# Patient Record
Sex: MALE | Race: OTHER | Employment: FULL TIME | URBAN - METROPOLITAN AREA
[De-identification: names, ages, dates, MRNs, and addresses within clinical notes are randomized per-mention and may not be internally consistent; named-entity substitution may affect disease eponyms.]

---

## 2019-04-01 ENCOUNTER — HOSPITAL ENCOUNTER (EMERGENCY)
Age: 29
Discharge: HOME | End: 2019-04-01
Attending: EMERGENCY MEDICINE
Payer: SELF-PAY

## 2019-04-01 ENCOUNTER — APPOINTMENT (OUTPATIENT)
Dept: GENERAL RADIOLOGY | Age: 29
End: 2019-04-01
Attending: EMERGENCY MEDICINE
Payer: SELF-PAY

## 2019-04-01 VITALS
RESPIRATION RATE: 16 BRPM | BODY MASS INDEX: 32.99 KG/M2 | WEIGHT: 198 LBS | HEART RATE: 77 BPM | DIASTOLIC BLOOD PRESSURE: 72 MMHG | TEMPERATURE: 98.1 F | OXYGEN SATURATION: 100 % | SYSTOLIC BLOOD PRESSURE: 125 MMHG | HEIGHT: 65 IN

## 2019-04-01 DIAGNOSIS — S62.611A CLOSED DISPLACED FRACTURE OF PROXIMAL PHALANX OF LEFT INDEX FINGER, INITIAL ENCOUNTER: Primary | ICD-10-CM

## 2019-04-01 PROCEDURE — 77030008303 HC SPLNT FNGR ALUM CONC -A

## 2019-04-01 PROCEDURE — 99283 EMERGENCY DEPT VISIT LOW MDM: CPT | Performed by: NURSE PRACTITIONER

## 2019-04-01 PROCEDURE — 75810000301 HC ER LEVEL 1 CLOSED TREATMNT FRACTURE/DISLOCATION: Performed by: NURSE PRACTITIONER

## 2019-04-01 PROCEDURE — 73140 X-RAY EXAM OF FINGER(S): CPT

## 2019-04-01 PROCEDURE — 74011250636 HC RX REV CODE- 250/636: Performed by: NURSE PRACTITIONER

## 2019-04-01 RX ORDER — HYDROCODONE BITARTRATE AND ACETAMINOPHEN 5; 325 MG/1; MG/1
1 TABLET ORAL
Qty: 18 TAB | Refills: 0 | Status: SHIPPED | OUTPATIENT
Start: 2019-04-01 | End: 2019-04-03

## 2019-04-01 RX ORDER — LIDOCAINE HYDROCHLORIDE 10 MG/ML
10 INJECTION INFILTRATION; PERINEURAL
Status: COMPLETED | OUTPATIENT
Start: 2019-04-01 | End: 2019-04-01

## 2019-04-01 RX ADMIN — LIDOCAINE HYDROCHLORIDE 1 ML: 10 INJECTION, SOLUTION INFILTRATION; PERINEURAL at 15:01

## 2019-04-01 NOTE — ED NOTES
I have reviewed discharge instructions with the patient. The patient verbalized understanding. Patient left ED via Discharge Method: ambulatory to Home with self. Opportunity for questions and clarification provided. Patient given 1  scripts.  at bedside during discharge. To continue your aftercare when you leave the hospital, you may receive an automated call from our care team to check in on how you are doing. This is a free service and part of our promise to provide the best care and service to meet your aftercare needs.  If you have questions, or wish to unsubscribe from this service please call [phone]. Thank you for Choosing our [facility] Emergency Department.

## 2019-04-01 NOTE — ED PROVIDER NOTES
Patient states he tripped and fell landing on his left hand. He states he now has pain to his second. The history is provided by the patient. Finger Pain This is a new problem. The current episode started 3 to 5 hours ago. The problem occurs constantly. The problem has not changed since onset. The pain is present in the left fingers. The quality of the pain is described as aching. The pain is at a severity of 4/10. The pain is moderate. Pertinent negatives include no numbness, full range of motion, no stiffness, no tingling, no itching, no back pain and no neck pain. He has tried nothing for the symptoms. There has been a history of trauma. No past medical history on file. No past surgical history on file. No family history on file. Social History Socioeconomic History  Marital status: SINGLE Spouse name: Not on file  Number of children: Not on file  Years of education: Not on file  Highest education level: Not on file Occupational History  Not on file Social Needs  Financial resource strain: Not on file  Food insecurity:  
  Worry: Not on file Inability: Not on file  Transportation needs:  
  Medical: Not on file Non-medical: Not on file Tobacco Use  Smoking status: Never Smoker Substance and Sexual Activity  Alcohol use: Never Frequency: Never  Drug use: Never  Sexual activity: Not on file Lifestyle  Physical activity:  
  Days per week: Not on file Minutes per session: Not on file  Stress: Not on file Relationships  Social connections:  
  Talks on phone: Not on file Gets together: Not on file Attends Sabianist service: Not on file Active member of club or organization: Not on file Attends meetings of clubs or organizations: Not on file Relationship status: Not on file  Intimate partner violence:  
  Fear of current or ex partner: Not on file Emotionally abused: Not on file Physically abused: Not on file Forced sexual activity: Not on file Other Topics Concern  Not on file Social History Narrative  Not on file ALLERGIES: Patient has no known allergies. Review of Systems Musculoskeletal: Positive for arthralgias and joint swelling. Negative for back pain, neck pain and stiffness. Skin: Negative for itching. Neurological: Negative for tingling and numbness. Vitals:  
 04/01/19 1237 BP: 125/69 Pulse: 76 Resp: 16 Temp: 97.7 °F (36.5 °C) SpO2: 100% Weight: 89.8 kg (198 lb) Height: 5' 5\" (1.651 m) Physical Exam  
Constitutional: He is oriented to person, place, and time. He appears well-developed and well-nourished. No distress. HENT:  
Head: Normocephalic and atraumatic. Eyes: Conjunctivae and EOM are normal.  
Neck: Normal range of motion. Neck supple. Cardiovascular: Normal rate and regular rhythm. Musculoskeletal:  
     Left hand: He exhibits decreased range of motion, bony tenderness and swelling. He exhibits normal capillary refill. Normal sensation noted. Normal strength noted. Hands: 
Neurological: He is alert and oriented to person, place, and time. No cranial nerve deficit or sensory deficit. GCS eye subscore is 4. GCS verbal subscore is 5. GCS motor subscore is 6. Skin: Skin is warm and dry. He is not diaphoretic. Nursing note and vitals reviewed. Xr 2nd [id number] V Result Date: 4/1/2019 Left second finger Clinical indications: Left second finger pain and swelling after a fall. FINDINGS: Three views of the left second finger submitted. There is a transverse fracture through the distal diaphysis of the proximal phalanx of the second finger. The distal fragment has rotated dorsally resulting in a right ankle deformity. IMPRESSION: Transverse fracture through the distal diaphysis of the proximal phalanx of the second finger with posterior rotation of the distal fracture fragment Digital block using 1% lidocaine without epi successful. Dr. Maddy Joseph attempted to relocate finger without success. Dr. Maddy Joseph discussed patient with Verna Ortiz from Dr. Peg Gonzalez office. Per Dr. Maddy Joseph ok to splint finger and have patient follow up with Dr. Nury Neri. MDM Number of Diagnoses or Management Options Closed displaced fracture of proximal phalanx of left index finger, initial encounter: new and requires workup Diagnosis management comments: Patient was discussed with Dr. Maddy Joseph. Patient was evaluated and attempted relocation by Dr. Maddy Joseph. Splint applied and prescription for norco given. Patient referred to Dr. Peg Gonzalez office for follow up. Amount and/or Complexity of Data Reviewed Tests in the radiology section of CPT®: ordered and reviewed Discuss the patient with other providers: yes (Maddy Joseph 
) Risk of Complications, Morbidity, and/or Mortality Presenting problems: moderate Diagnostic procedures: low Management options: moderate Patient Progress Patient progress: stable Procedures

## 2019-04-01 NOTE — ED TRIAGE NOTES
Patient has injury to the left 2nd digit. Patient was walking and tripped. Swelling and pain to finger.  used in triage.

## 2019-04-01 NOTE — DISCHARGE INSTRUCTIONS
Use leach férula de dedo hasta que yung ortopedia. Norco prachi prescrito para el dolor. Melba Lawson para programar [facility] pamela de seguimiento. Regrese al Departamento de Emergencias para cualquier síntoma nuevo o peor. Wear your finger splint until you see orthopedics. [place] as prescribed for pain. You will need to call Dr. Anya Busby office today to schedule a follow up appointment. Return to the Emergency Department for any new or worse symptoms.

## 2019-04-01 NOTE — PROGRESS NOTES
present for registration, triage, as well as for doctor's assessment and X ray. Thank you, Sandeep Marie DemChan [facility] [address] 
[phone] [email or web address]

## 2019-04-01 NOTE — PROGRESS NOTES
present when Ranell Pouch spoke to the patient as well as for discharge instructions. Thank you, Rowena Griffith [address] 
[phone] [email or web address]

## 2019-04-03 ENCOUNTER — HOSPITAL ENCOUNTER (OUTPATIENT)
Dept: SURGERY | Age: 29
Discharge: HOME | End: 2019-04-03

## 2019-04-03 VITALS
OXYGEN SATURATION: 97 % | RESPIRATION RATE: 18 BRPM | TEMPERATURE: 98.7 F | HEART RATE: 77 BPM | DIASTOLIC BLOOD PRESSURE: 75 MMHG | SYSTOLIC BLOOD PRESSURE: 120 MMHG | WEIGHT: 199 LBS | HEIGHT: 64 IN | BODY MASS INDEX: 33.97 KG/M2

## 2019-04-03 PROBLEM — S62.611A: Status: ACTIVE | Noted: 2019-04-03

## 2019-04-03 NOTE — PROGRESS NOTES
Interpreting services have been requested for procedure on 4/4/19. [facility]. Contact phone number is [phone]. Patient has been notified of time of arrival. 
 
 
 
Kylah Mancia Patient Relations & Interpreting Services 
c: [phone] / [email or web address] [place], [address] [facility] 
[email or web address]. [facility]

## 2019-04-03 NOTE — PERIOP NOTES
Patient verified name and . Patient provided medical/health information and PTA medications to the best of their ability. TYPE  CASE:1b  Order for consent not found in EHR at time of PAT  Labs per surgeon:none. Results: none  Labs per anesthesia protocol: none. Results none  EKG  :  na   USED FOR PAT ASSESMENT  Patient provided with and instructed on education handouts including Guide to Surgery, blood transfusions, pain management, and hand hygiene for the family and community, and [facility] brochure. Jenifer mist and instructions given per hospital policy. Instructed patient to continue previous medications as prescribed prior to surgery unless otherwise directed and to take the following medications the day of surgery according to anesthesia guidelines : none . Instructed patient to hold  the following medications: none. Original medication prescription bottles none visualized during patient appointment. Patient teach back successful and patient demonstrates knowledge of instruction.

## 2019-04-03 NOTE — H&P
Donte Pineda 1990 
male CC: New patient referred for left hand concerns (index injury). Vocation:  Diabetes: no Tobacco use: no 
 
HPI: Patient is a 25year old male right hand dominant with a chief complaint of left index finger injury. The symptoms started 2 days ago when he fell and tried to hold on with his left hand. He felt a snap and acute pain in the index finger. Evaluation to date has included XRs in ER. Treatment to date has included splint. The symptoms are improved by rest and exacerbated by any activity with the left hand. The current symptoms are rated a 7/10 and interfere with work and ADLs. ? Past Medical and Surgical, Family and Social History: This has been reviewed and documented on the patient intake form. See scanned documents for further information. Medications: referenced in chart Allergies: NKDA All medical history, medications and allergies have been discussed with the patient today. ROS: 
This has been reviewed and documented on the patient intake form. See scanned documents for further information. Physical Examination: Patient is a healthy appearing male in no acute distress. Bilateral upper limbs have equal and intact peripheral pulses. Skin does not demonstrate any rashes or lesions. Moderate swelling of the left index finger, intact sensation distally, good cap refill, limited motion due to pain but there is intact function of FDP/FDS/extensors. ? 
Imaging: left index proximal phalanx demonstrates fracture of the distal shaft with 90 degrees of dorsal angulation ? Assessment: S62.611A Displaced fracture of proximal phalanx of left index finger, initial encounter for closed fracture S62.611A Displaced fracture of proximal phalanx of left index finger, initial encounter for closed fracture Plan: We discussed the diagnosis and different treatment options. We discussed operative vs nonoperative management.  We discussed high risk of malunion, nonunion and stiffness with nonoperative management, risk of hardware problems, nonunion and stiffness with surgery. After thorough discussion, the patient elects to proceed with LEFT INDEX FINGER OPEN REDUCTION INTERNAL FIXATION. We placed him in a aluminum splint for comfort until surgery. Patient voiced accordance and understanding of the information provided and the formulated plan. All questions were answered to the patient's satisfaction during the encounter. ? Patient understands risks and benefits of LEFT INDEX FINGER OPEN REDUCTION INTERNAL FIXATION including but not limited to nerve injury, vessel injury, infection, failure to achieve desired results and possible need for additional surgery. Patient understands and wishes to proceed with surgery. On Exam:  
The patient is alert and oriented; ;  
Lung auscultation clear bilaterally Heart auscultation demonstrate RRR without murmurs Kit Booker MD 
04/03/19 
11:32 AM

## 2019-04-04 ENCOUNTER — HOSPITAL ENCOUNTER (OUTPATIENT)
Age: 29
Setting detail: OUTPATIENT SURGERY
Discharge: HOME | End: 2019-04-04
Attending: ORTHOPAEDIC SURGERY | Admitting: ORTHOPAEDIC SURGERY
Payer: SELF-PAY

## 2019-04-04 ENCOUNTER — ANESTHESIA EVENT (OUTPATIENT)
Dept: SURGERY | Age: 29
End: 2019-04-04
Payer: SELF-PAY

## 2019-04-04 ENCOUNTER — ANESTHESIA (OUTPATIENT)
Dept: SURGERY | Age: 29
End: 2019-04-04
Payer: SELF-PAY

## 2019-04-04 ENCOUNTER — APPOINTMENT (OUTPATIENT)
Dept: GENERAL RADIOLOGY | Age: 29
End: 2019-04-04
Attending: ORTHOPAEDIC SURGERY
Payer: SELF-PAY

## 2019-04-04 VITALS
OXYGEN SATURATION: 94 % | HEART RATE: 70 BPM | WEIGHT: 201 LBS | RESPIRATION RATE: 16 BRPM | DIASTOLIC BLOOD PRESSURE: 90 MMHG | TEMPERATURE: 97.7 F | BODY MASS INDEX: 34.5 KG/M2 | SYSTOLIC BLOOD PRESSURE: 153 MMHG

## 2019-04-04 PROCEDURE — 76060000032 HC ANESTHESIA 0.5 TO 1 HR: Performed by: ORTHOPAEDIC SURGERY

## 2019-04-04 PROCEDURE — 77030000032 HC CUF TRNQT ZIMM -B: Performed by: ORTHOPAEDIC SURGERY

## 2019-04-04 PROCEDURE — 77030033269 HC SLV COMPR SCD KNE2 CARD -B: Performed by: ORTHOPAEDIC SURGERY

## 2019-04-04 PROCEDURE — 76210000020 HC REC RM PH II FIRST 0.5 HR: Performed by: ORTHOPAEDIC SURGERY

## 2019-04-04 PROCEDURE — 73140 X-RAY EXAM OF FINGER(S): CPT

## 2019-04-04 PROCEDURE — 77030031139 HC SUT VCRL2 J&J -A: Performed by: ORTHOPAEDIC SURGERY

## 2019-04-04 PROCEDURE — 74011250636 HC RX REV CODE- 250/636

## 2019-04-04 PROCEDURE — 77030021122 HC SPLNT MAT FST BSNM -A: Performed by: ORTHOPAEDIC SURGERY

## 2019-04-04 PROCEDURE — 74011250636 HC RX REV CODE- 250/636: Performed by: ANESTHESIOLOGY

## 2019-04-04 PROCEDURE — 77030003602 HC NDL NRV BLK BBMI -B: Performed by: ANESTHESIOLOGY

## 2019-04-04 PROCEDURE — C1713 ANCHOR/SCREW BN/BN,TIS/BN: HCPCS | Performed by: ORTHOPAEDIC SURGERY

## 2019-04-04 PROCEDURE — 76942 ECHO GUIDE FOR BIOPSY: CPT | Performed by: ORTHOPAEDIC SURGERY

## 2019-04-04 PROCEDURE — 77030015464 HC BIT DRL QC SYNT -E: Performed by: ORTHOPAEDIC SURGERY

## 2019-04-04 PROCEDURE — 76010000160 HC OR TIME 0.5 TO 1 HR INTENSV-TIER 1: Performed by: ORTHOPAEDIC SURGERY

## 2019-04-04 PROCEDURE — 76210000063 HC OR PH I REC FIRST 0.5 HR: Performed by: ORTHOPAEDIC SURGERY

## 2019-04-04 PROCEDURE — 74011250636 HC RX REV CODE- 250/636: Performed by: ORTHOPAEDIC SURGERY

## 2019-04-04 PROCEDURE — 77030010509 HC AIRWY LMA MSK TELE -A: Performed by: ANESTHESIOLOGY

## 2019-04-04 PROCEDURE — 74011000250 HC RX REV CODE- 250

## 2019-04-04 PROCEDURE — C1769 GUIDE WIRE: HCPCS | Performed by: ORTHOPAEDIC SURGERY

## 2019-04-04 PROCEDURE — 77030018673: Performed by: ORTHOPAEDIC SURGERY

## 2019-04-04 PROCEDURE — 76010010054 HC POST OP PAIN BLOCK: Performed by: ORTHOPAEDIC SURGERY

## 2019-04-04 DEVICE — IMPLANTABLE DEVICE: Type: IMPLANTABLE DEVICE | Site: HAND | Status: FUNCTIONAL

## 2019-04-04 RX ORDER — PROPOFOL 10 MG/ML
INJECTION, EMULSION INTRAVENOUS AS NEEDED
Status: DISCONTINUED | OUTPATIENT
Start: 2019-04-04 | End: 2019-04-04 | Stop reason: HOSPADM

## 2019-04-04 RX ORDER — MIDAZOLAM HYDROCHLORIDE 1 MG/ML
2 INJECTION, SOLUTION INTRAMUSCULAR; INTRAVENOUS
Status: COMPLETED | OUTPATIENT
Start: 2019-04-04 | End: 2019-04-04

## 2019-04-04 RX ORDER — OXYCODONE HYDROCHLORIDE 5 MG/1
10 TABLET ORAL
Status: DISCONTINUED | OUTPATIENT
Start: 2019-04-04 | End: 2019-04-04 | Stop reason: HOSPADM

## 2019-04-04 RX ORDER — CEFAZOLIN SODIUM/WATER 2 G/20 ML
2 SYRINGE (ML) INTRAVENOUS ONCE
Status: COMPLETED | OUTPATIENT
Start: 2019-04-04 | End: 2019-04-04

## 2019-04-04 RX ORDER — SODIUM CHLORIDE, SODIUM LACTATE, POTASSIUM CHLORIDE, CALCIUM CHLORIDE 600; 310; 30; 20 MG/100ML; MG/100ML; MG/100ML; MG/100ML
100 INJECTION, SOLUTION INTRAVENOUS CONTINUOUS
Status: DISCONTINUED | OUTPATIENT
Start: 2019-04-04 | End: 2019-04-04 | Stop reason: HOSPADM

## 2019-04-04 RX ORDER — EPINEPHRINE 1 MG/ML
INJECTION, SOLUTION, CONCENTRATE INTRAVENOUS
Status: COMPLETED | OUTPATIENT
Start: 2019-04-04 | End: 2019-04-04

## 2019-04-04 RX ORDER — BUPIVACAINE HYDROCHLORIDE 5 MG/ML
INJECTION, SOLUTION EPIDURAL; INTRACAUDAL
Status: COMPLETED | OUTPATIENT
Start: 2019-04-04 | End: 2019-04-04

## 2019-04-04 RX ORDER — SODIUM CHLORIDE 0.9 % (FLUSH) 0.9 %
5-40 SYRINGE (ML) INJECTION EVERY 8 HOURS
Status: DISCONTINUED | OUTPATIENT
Start: 2019-04-04 | End: 2019-04-04 | Stop reason: HOSPADM

## 2019-04-04 RX ORDER — LIDOCAINE HYDROCHLORIDE 10 MG/ML
0.3 INJECTION INFILTRATION; PERINEURAL ONCE
Status: DISCONTINUED | OUTPATIENT
Start: 2019-04-04 | End: 2019-04-04 | Stop reason: HOSPADM

## 2019-04-04 RX ORDER — SODIUM CHLORIDE 0.9 % (FLUSH) 0.9 %
5-40 SYRINGE (ML) INJECTION AS NEEDED
Status: DISCONTINUED | OUTPATIENT
Start: 2019-04-04 | End: 2019-04-04 | Stop reason: HOSPADM

## 2019-04-04 RX ORDER — LIDOCAINE HYDROCHLORIDE 20 MG/ML
INJECTION, SOLUTION EPIDURAL; INFILTRATION; INTRACAUDAL; PERINEURAL AS NEEDED
Status: DISCONTINUED | OUTPATIENT
Start: 2019-04-04 | End: 2019-04-04 | Stop reason: HOSPADM

## 2019-04-04 RX ORDER — HYDROMORPHONE HYDROCHLORIDE 2 MG/ML
0.5 INJECTION, SOLUTION INTRAMUSCULAR; INTRAVENOUS; SUBCUTANEOUS
Status: DISCONTINUED | OUTPATIENT
Start: 2019-04-04 | End: 2019-04-04 | Stop reason: HOSPADM

## 2019-04-04 RX ORDER — ONDANSETRON 2 MG/ML
INJECTION INTRAMUSCULAR; INTRAVENOUS AS NEEDED
Status: DISCONTINUED | OUTPATIENT
Start: 2019-04-04 | End: 2019-04-04 | Stop reason: HOSPADM

## 2019-04-04 RX ORDER — FENTANYL CITRATE 50 UG/ML
100 INJECTION, SOLUTION INTRAMUSCULAR; INTRAVENOUS ONCE
Status: COMPLETED | OUTPATIENT
Start: 2019-04-04 | End: 2019-04-04

## 2019-04-04 RX ADMIN — EPINEPHRINE 0.01 MG: 1 INJECTION, SOLUTION, CONCENTRATE INTRAVENOUS at 12:13

## 2019-04-04 RX ADMIN — PROPOFOL 200 MG: 10 INJECTION, EMULSION INTRAVENOUS at 13:17

## 2019-04-04 RX ADMIN — FENTANYL CITRATE 100 MCG: 50 INJECTION INTRAMUSCULAR; INTRAVENOUS at 12:08

## 2019-04-04 RX ADMIN — BUPIVACAINE HYDROCHLORIDE 35 ML: 5 INJECTION, SOLUTION EPIDURAL; INTRACAUDAL at 12:13

## 2019-04-04 RX ADMIN — ONDANSETRON 4 MG: 2 INJECTION INTRAMUSCULAR; INTRAVENOUS at 13:25

## 2019-04-04 RX ADMIN — MIDAZOLAM HYDROCHLORIDE 2 MG: 2 INJECTION, SOLUTION INTRAMUSCULAR; INTRAVENOUS at 12:08

## 2019-04-04 RX ADMIN — LIDOCAINE HYDROCHLORIDE 100 MG: 20 INJECTION, SOLUTION EPIDURAL; INFILTRATION; INTRACAUDAL; PERINEURAL at 13:17

## 2019-04-04 RX ADMIN — SODIUM CHLORIDE, SODIUM LACTATE, POTASSIUM CHLORIDE, AND CALCIUM CHLORIDE: 600; 310; 30; 20 INJECTION, SOLUTION INTRAVENOUS at 13:12

## 2019-04-04 RX ADMIN — Medication 2 G: at 13:15

## 2019-04-04 RX ADMIN — SODIUM CHLORIDE, SODIUM LACTATE, POTASSIUM CHLORIDE, AND CALCIUM CHLORIDE 100 ML/HR: 600; 310; 30; 20 INJECTION, SOLUTION INTRAVENOUS at 12:00

## 2019-04-04 NOTE — ANESTHESIA PROCEDURE NOTES
Supraclavicular block with ultrasound    Start time: 4/4/2019 12:08 PM  End time: 4/4/2019 12:13 PM  Performed by: Danielle Alcantar MD  Authorized by: Danielle Alcantar MD       Pre-procedure:    Indications: at surgeon's request and post-op pain management    Preanesthetic Checklist: patient identified, risks and benefits discussed, site marked, timeout performed, anesthesia consent given and patient being monitored    Timeout Time: 12:08          Block Type:   Block Type:  Supraclavicular  Laterality:  Left  Monitoring:  Continuous pulse ox, frequent vital sign checks, heart rate, oxygen and responsive to questions  Injection Technique:  Single shot  Procedures: ultrasound guided    Patient Position: supine (head elevated 45 degrees)  Prep: chlorhexidine    Location:  Supraclavicular  Needle Type:  Stimuplex  Needle Gauge:  20 G  Needle Localization:  Ultrasound guidance    Assessment:  Number of attempts:  1  Injection Assessment:  Incremental injection every 5 mL, local visualized surrounding nerve on ultrasound, negative aspiration for blood, no intravascular symptoms, no paresthesia and ultrasound image on chart  Patient tolerance:  Patient tolerated the procedure well with no immediate complications

## 2019-04-04 NOTE — OP NOTES
ORTHOPAEDIC SURGICAL NOTE        Moarima Jones male 25 y.o.  [id number]   4/4/2019     PRE-OP DIAGNOSIS: Closed displaced fracture of proximal phalanx of left index finger, initial encounter [O76.796R]   POST-OP DIAGNOSIS: Closed displaced fracture of proximal phalanx of left index finger, initial encounter [V82.220U]   LATERALITY: Left     PROCEDURES PERFORMED:   Open reduction internal fixation of left index finger proximal phalanx fracture (27070),        SURGEON:   Marcie Cota MD, PhD     IMPLANTS:   Implant Name Type Inv. Item Serial No.  Lot No. LRB No. Used Action   SCR COMP HDLSS L-THRD [id number] --  - [id number]  SCR COMP HDLSS L-THRD [id number] --   [employer] [place] [id number] Left 1 Implanted      Procedure(s):  LEFT PHALANX FINGER OPEN REDUCTION INTERNAL FIXATION / PLEXUS /    Surgeon(s):  Karmen Lujan MD   Procedure(s):  LEFT PHALANX FINGER OPEN REDUCTION INTERNAL FIXATION / Radha Valadez / She Simmons      ANESTHESIA: Regional     STAFF:    Circ-1: Juan Luis Morrissey RN  Scrub Tech-1: Rigo Rendon     ESTIMATED BLOOD LOSS: None       TOTAL IV FLUIDS : See anesthesia note    COMPLICATIONS: None     DRAINS:       TOURNIQUET TIME:   Total Tourniquet Time Documented:  Upper Arm (Left) - 14 minutes  Total: Upper Arm (Left) - 14 minutes       INDICATION FOR PROCEDURE:     Moraima Jones sustained the above mentioned injuries as a result of a fall. Surgical and non-surgical treatment options were discussed with the patient and their family, as well as the risk and benefits of each option. After thorough discussion, the patient decided to proceed with surgical management.   Specific to this treatment plan, we discussed in detail surgical risks including scar, pain, bleeding, infection, anesthetic risks, neurovascular injury, failure to achieve desired results, hardware problems, need for further surgery,  weakness, stiffness, risk of death and potential risk of other unforseen complication. The patient consented to the procedure after discussion of the risks and benefits. DESCRIPTION OF PROCEDURE:     The patient was identified in the holding room. The Left left was marked and confirmed as the correct operative site. They were then brought to the OR and general anesthesia was induced. They were transferred onto the OR table in the supine position. All bony prominences were well padded. SCDs were placed on the bilateral legs throughout the case. A timeout was performed, verifying the correct patient, the correct side being the Left side and the correct procedure. Antibiotics were then administered, and were redosed during the procedure as needed at indicated intervals. A non-sterile tourniquet was placed on the left arm. The Left upper extremity was pre scrubbed and then prepped and draped in routine sterile fashion. An incisional timeout was performed re-confirming the correct patient, surgical site and procedure, as well as verifying antibiotics. The fracture was reduced with a combination of axial traction and flexion of the PIP joint. Reduction was confirmed under fluoroscopy. A guide wire ws inserted into the proximal phalanx head and into the medullary canal. Pin placement and reduction were confirmed under fluoroscopy. A 4mm longitudinal incision was done over the guidewire down to the joint, a 2.4mm x 30mm headless compression screw was then inserted. Screw placement was confirmed under orthogonal flruoscopy. The tourniquet was deflated and the wound was then thoroughly irrigated and closed with 5-0 monocryl and glue. A sterile dressing was applied followed by a compressive dressing     The patient was awakened and taken to PACU in stable condition. All sponge and needle counts were correct at the end of the case. I was present and scrubbed for the entire procedure.       DISPOSITION:    The patient will be discharged home.      Weightbearing status: NWB to operative extremity but motion as tolerated       CHEMICAL VTE (DVT) PROPHYLAXIS: None warranted for this case     FOLLOW-UP:  10-14 days for suture removal.     Aris Turner MD    04/04/19  1:53 PM

## 2019-04-04 NOTE — ANESTHESIA PREPROCEDURE EVALUATION
Relevant Problems No relevant active problems Anesthetic History Comments: None prior, no family problems known Review of Systems / Medical History Patient summary reviewed and pertinent labs reviewed Pulmonary Undiagnosed apnea Comments: No sleep study, suspect BRITTNEY by history Neuro/Psych Within defined limits Cardiovascular Exercise tolerance: >4 METS 
  
GI/Hepatic/Renal 
Within defined limits Endo/Other Within defined limits Other Findings Physical Exam 
 
Airway Mallampati: I 
TM Distance: 4 - 6 cm Neck ROM: normal range of motion Mouth opening: Normal 
 
Comments: Very thick neck Cardiovascular Rhythm: regular Rate: normal 
 
 
 
 Dental 
No notable dental hx Pulmonary Breath sounds clear to auscultation Abdominal 
 
 
 
 Other Findings Anesthetic Plan ASA: 2 Anesthesia type: general 
 
 
Post-op pain plan if not by surgeon: peripheral nerve block single Induction: Intravenous Anesthetic plan and risks discussed with: Patient Discussed GA with LMA and supraclavicular block for postop pain

## 2019-04-04 NOTE — PROGRESS NOTES
present at bedside during assessment and nerve block with Dr. Tyesha Garces and signature of consents. Crow Coronado Patient Relations & Interpreting Services 
c: [phone] / [email or web address] [address] / [place], [address] [facility] 
[email or web address]. [email or web address]

## 2019-04-04 NOTE — ANESTHESIA POSTPROCEDURE EVALUATION
Procedure(s): LEFT PHALANX FINGER OPEN REDUCTION INTERNAL FIXATION / [place] / [address] . general, regional 
 
Anesthesia Post Evaluation Multimodal analgesia: multimodal analgesia used between 6 hours prior to anesthesia start to PACU discharge Patient location during evaluation: bedside Patient participation: complete - patient participated Level of consciousness: awake and alert Pain score: 3 Pain management: adequate Airway patency: patent Anesthetic complications: no 
Cardiovascular status: acceptable and hemodynamically stable Respiratory status: acceptable Hydration status: acceptable Post anesthesia nausea and vomiting:  none Vitals Value Taken Time /88 4/4/2019  2:20 PM  
Temp 36.5 °C (97.7 °F) 4/4/2019  1:53 PM  
Pulse 70 4/4/2019  2:20 PM  
Resp 16 4/4/2019  2:15 PM  
SpO2 93 % 4/4/2019  2:20 PM  
Vitals shown include unvalidated device data.

## 2019-04-04 NOTE — H&P
H&P Update: Carmelita Reina was seen and examined. History and physical has been reviewed. The patient has been examined.  There have been no significant clinical changes since the completion of the originally dated History and Physical. 
 
Signed By: Familia Pollard MD   
 April 4, 2019 11:41 AM

## 2019-04-04 NOTE — DISCHARGE INSTRUCTIONS
Postoperative  Instructions: Instrucciones Postoperatorias:    Weightbearing or Lifting:  Peso en la mano:  Limit  weight  lifting  to  less  than  2  pounds  (coffee  mug)  for  the  first  2  weeks  after  surgery. Mayda Rodríguez a no mas de 2 libras (taza de cafe) for las 2 primeras semanas despues de la [place]. Dressing  instructions:    Cristy operatoria:  Keep  your  dressing  and/or  splint  clean  and  dry  at  all  times. You  can  remove  your  dressing  on  post-operative  day  #5  and  change  with  a  dry/sterile  dressing  or  Band-Aids  as  needed  thereafter. Manten to [place] operatoria limpia y seca en todo momento. Puedes retirar la cristy 5 hills despues de la [place] y [place] por sonny cristy diaria prachi gaza o sonny curita (Band aid). Showering  Instructions: Instrucciones para la Ducha:  May  shower  But keep surgical dressing clean and dry until removed as explained above. After dressing is removed, you may allows soapy water to run through the incision during showers but do not scrub. After each shower, pat dry and apply a dry dressing. Do  not  soak  your  Incision in still water or bathtub  for  3  weeks  after  surgery. If  the  incision  gets  wet otherwise,  pat  dry  and  do  not  scrub  the  incision. Do  not  apply  cream  or  lotion  to  incision      Puedes duchar fadi manten la cristy seca hasta el joellen que la retires. Despues de retirar la cristy puedes mojar el area [place] con agua y jabon fadi no restregues la incision. Despues de cada ducha seca la incision fadi no la restregues. No sumerjas la mano en agua por 3 semanas despues de la [place]. . Si la incision se moja, secala prontamente, no la restregues. No le apliques crema o locion a la incision    Pain  Control:  Control del Dolor:  - You  have  been  given  a  prescription  to  be  taken  as  directed  for  post-operative  pain  control.     In  addition,  elevate  the  operative  extremity  above the  heart  at  all  times  to  prevent  swelling  and  throbbing  pain. - Nausea  is  a  common  side  effect  of  many  pain  medications. You  will  want  to  eat something  before  taking  your  pain  medicine  to  help  prevent  Nausea. Other  pain  relieving  options:   - Using  a  cold  pack  to  ice  the  affected  area  a  few  times  a  day  (15  to  20  minutes  at  a  time)  can  help  to  relieve  pain,  reduce  swelling  and  bruising.      - Elevation  of  the  affected  area  can  also  help  to  reduce  pain  and  swelling. Te dieron un prescripcion que la debes isaiah prachi indicado para controlar el dolor postoperatorio. Adicionalmente, eleva la mano quirurgica por encima del vicente en todo momento para prevenir hinchazon y dolor gale. La nausea es un efecto secundario comun del medicamento para el dolor. Come algo antes de isaiah el medicamento para prevenir la nausea. Otras medidas para reducir el dolor:  Hielo or un pack frio al area operatoria (15 a 20 minutos), esto puede disminuir el dolor, disminuye la hinchazon y la coloracion. Tryon la mano quirurgica, esto tambien ayuda a disminuir el dolor y la hinchazon    Did  you  receive  a  nerve  Block? A  nerve  block  can  provide  pain  relief  for  one  hour  to  two  days  after  your  surgery. As  long  as  the  nerve  block  is  working,  you  will  experience  little  or  no  sensation  in  the  area  the  surgeon  operated  on. As  the  nerve  block  wears  off,  you  will  begin  to  experience  pain  or  discomfort. It  is  very  important  that  you  begin  taking  your  prescribed  pain  medication  before  the  nerve  block  fully  wears  off. The first sign that the nerve block is wearing off is tingling in your fingers. Treating  your  pain  at  the  first  sign  of  the  block  wearing  off  will  ensure  your  pain  is  better  controlled  and  more  tolerable  when  full-sensation  returns.   Do  not  wait until  the  pain  is  intolerable,  as  the  medicine  will  be  less  effective. It  is  better  to  treat  pain  in  advance  than  to  try  and  catch  up. General  Anesthesia or Sedation:      If  you  did  not  receive  a  nerve  block  during  your  surgery,  you  will  need  to  start  taking  your  pain  medication  shortly  after  your  surgery  and  should  continue  to  do  so  as  prescribed  by  your  surgeon. Please  call  [phone]  with any concern and ask to speak with Cookie Ferrera. Concerning problems include:      -  Excessive  redness  of  the  incisions      -  Drainage  for  more  than  2  Days after surgery or any foul smelling drainage  -  Fever  of  more  than  101.5  F      Please  call  [phone]  if  you  do  not  receive  or  are  unsure  of  your  first  follow-up  appointment. You  should  see  the  doctor  10-14  days  after  your  Surgery. Thank you for choosing me and [address] [place] [address] for your care. I will go above and beyond to ensure you receive the best care possible. Derek Toussaint MD, PhD    [facility] general o sedación [place], [place] 24 horas o mientras esté tomando narcóticos con receta:  Limite cierra actividades  No conducir y operar maquinaria peligrosa  No isaiah importantes decisiones personales o empresariales  No tome bebidas alcohólicas  Si no ha orinado en 8 horas después de la descarga, por favor, póngase en contacto con leach cirujano de leslie. * Por favor jacqueline sonny lista de cierra medicamentos actuales a leach médico de cabecera. * Por favor actualizar esta lista cada vez que se suspenden cierra medicamentos, las dosis son      Se añaden los medicamentos modificados o nuevos (incluyendo el exceso de productos de [place]). * Por favor, llevar información de medicamentos en todo momento en cookie de situaciones de emergencia.     Estas son las instrucciones generales para un estilo de carly saludable:  No fumar / No hay productos de tabaco / Alba Solid la exposición al humo de segunda mano  Advertencia del Cirujano General: Dejar de fumar ahora disminuye considerablemente el riesgo grave para leach kayley. La obesidad, el tabaquismo y el sedentarismo [address] en gran medida el riesgo de enfermedades  Sonny dieta saludable, ejercicio físico regular y el seguimiento de peso son importantes para mantener un estilo de carly saludable    Es posible que se reteniendo líquidos si usted tiene antecedentes de insuficiencia cardíaca o si experimenta cualquiera de los siguientes síntomas: Aumento de peso de 3 libras o más chris la noche o 5 libras en sonny semana, el aumento de la hinchazón en las ra o los pies o falta de [place], mientras acostado en la cama. Por favor, llame a leach médico tan pronto prachi usted nota cualquiera de estos síntomas; no espere hasta leach próxima visita al [employer]. Reconocer los signos y síntomas de accidente cerebrovascular:    F-laura se ve desigual  A-brazos incapaz de moverse o desplazarse de forma desigual  S-trastornos del habla o inexistente  J-tsikac-zkggr al 911 tan pronto prachi signos y síntomas comienzan-NO Nataliya Marten a la cama o esperar a jackie si mejora-tiempo es cerebro.

## 2019-04-04 NOTE — PROGRESS NOTES
present at bedside during information verification with OR staff. [facility] [employer] [facility] [employer] [facility] 
c: [phone] / [email or web address] 
[facility] / [place], [facility] 
[email or web address]. [facility]

## 2022-03-19 PROBLEM — S62.611A: Status: ACTIVE | Noted: 2019-04-03

## 2025-08-29 ENCOUNTER — APPOINTMENT (OUTPATIENT)
Dept: CT IMAGING | Age: 35
End: 2025-08-29

## 2025-08-29 ENCOUNTER — HOSPITAL ENCOUNTER (EMERGENCY)
Age: 35
Discharge: HOME | End: 2025-08-29

## 2025-08-29 VITALS
TEMPERATURE: 99.1 F | BODY MASS INDEX: 32.2 KG/M2 | SYSTOLIC BLOOD PRESSURE: 121 MMHG | DIASTOLIC BLOOD PRESSURE: 74 MMHG | WEIGHT: 175 LBS | OXYGEN SATURATION: 98 % | HEIGHT: 62 IN | HEART RATE: 64 BPM | RESPIRATION RATE: 16 BRPM

## 2025-08-29 DIAGNOSIS — L03.311 CELLULITIS OF ABDOMINAL WALL: Primary | ICD-10-CM

## 2025-08-29 LAB
ALBUMIN SERPL-MCNC: 3.5 G/DL (ref 3.5–5)
ALBUMIN/GLOB SERPL: 1.1 (ref 1–1.9)
ALP SERPL-CCNC: 103 U/L (ref 40–129)
ALT SERPL-CCNC: 102 U/L (ref 8–55)
ANION GAP SERPL CALC-SCNC: 12 MMOL/L (ref 7–16)
AST SERPL-CCNC: 56 U/L (ref 15–37)
BASOPHILS # BLD: 0.01 K/UL (ref 0–0.2)
BASOPHILS NFR BLD: 0.1 % (ref 0–2)
BILIRUB SERPL-MCNC: 0.3 MG/DL (ref 0–1.2)
BUN SERPL-MCNC: 11 MG/DL (ref 6–23)
CALCIUM SERPL-MCNC: 9.2 MG/DL (ref 8.8–10.2)
CHLORIDE SERPL-SCNC: 102 MMOL/L (ref 98–107)
CO2 SERPL-SCNC: 25 MMOL/L (ref 20–29)
CREAT SERPL-MCNC: 0.75 MG/DL (ref 0.8–1.3)
DIFFERENTIAL METHOD BLD: NORMAL
EOSINOPHIL # BLD: 0.09 K/UL (ref 0–0.8)
EOSINOPHIL NFR BLD: 1.3 % (ref 0.5–7.8)
ERYTHROCYTE [DISTWIDTH] IN BLOOD BY AUTOMATED COUNT: 12.8 % (ref 11.9–14.6)
GLOBULIN SER CALC-MCNC: 3.3 G/DL (ref 2.3–3.5)
GLUCOSE SERPL-MCNC: 118 MG/DL (ref 70–99)
HCT VFR BLD AUTO: 41.2 % (ref 41.1–50.3)
HGB BLD-MCNC: 13.8 G/DL (ref 13.6–17.2)
IMM GRANULOCYTES # BLD AUTO: 0.02 K/UL (ref 0–0.5)
IMM GRANULOCYTES NFR BLD AUTO: 0.3 % (ref 0–5)
LYMPHOCYTES # BLD: 1.26 K/UL (ref 0.5–4.6)
LYMPHOCYTES NFR BLD: 17.8 % (ref 13–44)
MCH RBC QN AUTO: 28.3 PG (ref 26.1–32.9)
MCHC RBC AUTO-ENTMCNC: 33.5 G/DL (ref 31.4–35)
MCV RBC AUTO: 84.6 FL (ref 82–102)
MONOCYTES # BLD: 0.4 K/UL (ref 0.1–1.3)
MONOCYTES NFR BLD: 5.6 % (ref 4–12)
NEUTS SEG # BLD: 5.31 K/UL (ref 1.7–8.2)
NEUTS SEG NFR BLD: 74.9 % (ref 43–78)
NRBC # BLD: 0 K/UL (ref 0–0.2)
PLATELET # BLD AUTO: 184 K/UL (ref 150–450)
PMV BLD AUTO: 10.5 FL (ref 9.4–12.3)
POTASSIUM SERPL-SCNC: 4.2 MMOL/L (ref 3.5–5.1)
PROT SERPL-MCNC: 6.8 G/DL (ref 6.3–8.2)
RBC # BLD AUTO: 4.87 M/UL (ref 4.23–5.6)
SODIUM SERPL-SCNC: 139 MMOL/L (ref 136–145)
WBC # BLD AUTO: 7.1 K/UL (ref 4.3–11.1)

## 2025-08-29 PROCEDURE — 80053 COMPREHEN METABOLIC PANEL: CPT

## 2025-08-29 PROCEDURE — 85025 COMPLETE CBC W/AUTO DIFF WBC: CPT

## 2025-08-29 PROCEDURE — 6360000004 HC RX CONTRAST MEDICATION: Performed by: PHYSICIAN ASSISTANT

## 2025-08-29 PROCEDURE — 74177 CT ABD & PELVIS W/CONTRAST: CPT

## 2025-08-29 PROCEDURE — 99285 EMERGENCY DEPT VISIT HI MDM: CPT

## 2025-08-29 RX ORDER — CEPHALEXIN 500 MG/1
500 CAPSULE ORAL 4 TIMES DAILY
Qty: 28 CAPSULE | Refills: 0 | Status: SHIPPED | OUTPATIENT
Start: 2025-08-29 | End: 2025-09-05

## 2025-08-29 RX ORDER — IOPAMIDOL 755 MG/ML
100 INJECTION, SOLUTION INTRAVASCULAR
Status: COMPLETED | OUTPATIENT
Start: 2025-08-29 | End: 2025-08-29

## 2025-08-29 RX ADMIN — IOPAMIDOL 100 ML: 755 INJECTION, SOLUTION INTRAVENOUS at 12:30

## 2025-08-29 ASSESSMENT — LIFESTYLE VARIABLES
HOW MANY STANDARD DRINKS CONTAINING ALCOHOL DO YOU HAVE ON A TYPICAL DAY: PATIENT DOES NOT DRINK
HOW OFTEN DO YOU HAVE A DRINK CONTAINING ALCOHOL: NEVER

## 2025-08-29 ASSESSMENT — PAIN SCALES - GENERAL: PAINLEVEL_OUTOF10: 6

## 2025-08-29 ASSESSMENT — PAIN - FUNCTIONAL ASSESSMENT: PAIN_FUNCTIONAL_ASSESSMENT: 0-10

## 2025-08-29 ASSESSMENT — PAIN DESCRIPTION - LOCATION: LOCATION: ABDOMEN

## 2025-08-29 ASSESSMENT — PAIN DESCRIPTION - ORIENTATION: ORIENTATION: LOWER

## 2025-08-29 ASSESSMENT — PAIN DESCRIPTION - PAIN TYPE: TYPE: ACUTE PAIN

## (undated) DEVICE — KENDALL SCD EXPRESS SLEEVES, KNEE LENGTH, LARGE: Brand: KENDALL SCD

## (undated) DEVICE — BIT DRL L150MM DIA2MM CANN QUIK CPL W/O STP REUSE FOR 3MM

## (undated) DEVICE — STERILE HOOK LOCK LATEX FREE ELASTIC BANDAGE 2INX5YD: Brand: HOOK LOCK™

## (undated) DEVICE — DISPOSABLE BIPOLAR CODE, 12' (3.66 M): Brand: CONMED

## (undated) DEVICE — ZIMMER® STERILE DISPOSABLE TOURNIQUET CUFF WITH PLC, DUAL PORT, SINGLE BLADDER, 18 IN. (46 CM)

## (undated) DEVICE — SUTURE COAT VCRL SZ 4-0 L18IN ABSRB UD L19MM PS-2 1/2 CIR J496G

## (undated) DEVICE — GUIDEWIRE ORTH L150MM DIA1.1MM S STL NTHRD FOR 3MM CANN SCR

## (undated) DEVICE — PADDING CAST W2INXL4YD ST COT COHESIVE HND TEARABLE SPEC

## (undated) DEVICE — SPLINT ORTH W3XL15IN PLSTR OF PARIS LO EXOTHERM SMOOTH

## (undated) DEVICE — BANDAGE COMPR 9 FTX4 IN SMOOTH COMFORTABLE SYNTH ESMRK LF

## (undated) DEVICE — DRAPE XR C ARM 41X74IN LF --

## (undated) DEVICE — 3M™ IOBAN™ 2 ANTIMICROBIAL INCISE DRAPE 6650EZ: Brand: IOBAN™ 2